# Patient Record
Sex: FEMALE | Race: WHITE | Employment: UNEMPLOYED | URBAN - METROPOLITAN AREA
[De-identification: names, ages, dates, MRNs, and addresses within clinical notes are randomized per-mention and may not be internally consistent; named-entity substitution may affect disease eponyms.]

---
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
## 2025-01-18 LAB
ESTIMATED AVERAGE GLUCOSE: NORMAL
HBA1C MFR BLD: 5.8 %

## 2025-05-01 ENCOUNTER — OFFICE VISIT (OUTPATIENT)
Age: 20
End: 2025-05-01

## 2025-05-01 VITALS
HEART RATE: 100 BPM | HEIGHT: 65 IN | TEMPERATURE: 98.9 F | WEIGHT: 204.7 LBS | SYSTOLIC BLOOD PRESSURE: 100 MMHG | BODY MASS INDEX: 34.1 KG/M2 | DIASTOLIC BLOOD PRESSURE: 67 MMHG | RESPIRATION RATE: 16 BRPM

## 2025-05-01 DIAGNOSIS — F41.9 ANXIETY: Primary | ICD-10-CM

## 2025-05-01 DIAGNOSIS — R07.89 ATYPICAL CHEST PAIN: ICD-10-CM

## 2025-05-01 RX ORDER — BUPROPION HYDROCHLORIDE 300 MG/1
300 TABLET ORAL DAILY
COMMUNITY
Start: 2025-04-16

## 2025-05-01 RX ORDER — BUSPIRONE HYDROCHLORIDE 5 MG/1
5 TABLET ORAL 2 TIMES DAILY
COMMUNITY
Start: 2025-05-01

## 2025-05-01 RX ORDER — BUPROPION HYDROCHLORIDE 150 MG/1
150 TABLET ORAL DAILY
COMMUNITY
Start: 2025-02-06 | End: 2025-05-01

## 2025-05-01 RX ORDER — HYDROXYZINE HYDROCHLORIDE 25 MG/1
25 TABLET, FILM COATED ORAL EVERY 8 HOURS PRN
Qty: 30 TABLET | Refills: 1 | Status: SHIPPED | OUTPATIENT
Start: 2025-05-01 | End: 2025-05-11

## 2025-05-01 ASSESSMENT — ENCOUNTER SYMPTOMS: CHEST TIGHTNESS: 1

## 2025-05-01 NOTE — PROGRESS NOTES
reviewed.   Constitutional:       Appearance: Normal appearance.   HENT:      Head: Normocephalic and atraumatic.      Mouth/Throat:      Pharynx: No posterior oropharyngeal erythema.   Eyes:      Extraocular Movements: Extraocular movements intact.      Pupils: Pupils are equal, round, and reactive to light.   Cardiovascular:      Rate and Rhythm: Normal rate and regular rhythm.      Pulses: Normal pulses.      Heart sounds: Normal heart sounds. No murmur heard.  Pulmonary:      Effort: Pulmonary effort is normal.      Breath sounds: Normal breath sounds.   Musculoskeletal:         General: No swelling or tenderness. Normal range of motion.      Cervical back: Normal range of motion and neck supple.   Skin:     General: Skin is warm and dry.   Neurological:      General: No focal deficit present.      Mental Status: She is alert and oriented to person, place, and time.      Cranial Nerves: No cranial nerve deficit.      Sensory: No sensory deficit.      Motor: No weakness.      Coordination: Coordination normal.   Psychiatric:         Mood and Affect: Mood is anxious. Affect is blunt and tearful.         Behavior: Behavior is agitated.           An electronic signature was used to authenticate this note.    --Kymberly Monique PA-C

## (undated) DEVICE — CATHETER CHOLANGIOGRAM 4.5 FRX3 IN W/ 20018M55 TAUT INTRO

## (undated) DEVICE — FIXED STAPLE DRILL GUIDE HEAD  - SIZE 10: Brand: EASYCLIP

## (undated) DEVICE — DRAPE,LAP,CHOLE,W/TROUGHS,STERILE: Brand: MEDLINE

## (undated) DEVICE — APPLIER CLP M/L SHFT DIA5MM 15 LIG LIGAMAX 5

## (undated) DEVICE — SYRINGE, LUER LOCK, 10ML: Brand: MEDLINE

## (undated) DEVICE — SYRINGE MED 30ML STD CLR PLAS LUERLOCK TIP N CTRL DISP

## (undated) DEVICE — LAPAROSCOPY PACK: Brand: MEDLINE INDUSTRIES, INC.

## (undated) DEVICE — TISSUE RETRIEVAL SYSTEM: Brand: INZII RETRIEVAL SYSTEM

## (undated) DEVICE — SET EXTN PRIMING 4.9ML L30IN INCL SLDE CLMP SPIN M LUERLOCK

## (undated) DEVICE — SUTURE VCRL + SZ 4-0 L18IN ABSRB UD L19MM PS-2 3/8 CIR PRIM VCP496H

## (undated) DEVICE — SUTURE VCRL + SZ 0 L27IN CT 3 ABSRB VCP329H

## (undated) DEVICE — NEEDLE,22GX1.5",REG,BEVEL: Brand: MEDLINE

## (undated) DEVICE — STERILE POLYISOPRENE POWDER-FREE SURGICAL GLOVES: Brand: PROTEXIS

## (undated) DEVICE — CORD ES L10FT MPLR LAP

## (undated) DEVICE — Device

## (undated) DEVICE — C-ARM: Brand: UNBRANDED

## (undated) DEVICE — INSUFFLATION NEEDLE TO ESTABLISH PNEUMOPERITONEUM.: Brand: INSUFFLATION NEEDLE

## (undated) DEVICE — SPONGE,LAP,4"X18",XR,ST,5/PK,40PK/CS: Brand: MEDLINE INDUSTRIES, INC.

## (undated) DEVICE — ELECTRODE PT RET AD L9FT HI MOIST COND ADH HYDRGEL CORDED

## (undated) DEVICE — MERCY FAIRFIELD TURNOVER KIT: Brand: MEDLINE INDUSTRIES, INC.

## (undated) DEVICE — APPLICATOR MEDICATED 26 CC SOLUTION HI LT ORNG CHLORAPREP

## (undated) DEVICE — PLUMEPORT ACTIV LAPAROSCOPIC SMOKE FILTRATION DEVICE: Brand: PLUMEPORT ACTIVE

## (undated) DEVICE — SOLUTION IRRIG 1000ML 0.9% SOD CHL USP POUR PLAS BTL

## (undated) DEVICE — TROCAR: Brand: KII® SLEEVE

## (undated) DEVICE — TROCAR: Brand: KII FIOS FIRST ENTRY

## (undated) DEVICE — LAPAROSCOPIC SCISSORS: Brand: EPIX LAPAROSCOPIC SCISSORS

## (undated) DEVICE — CATHETER CHOLGM 4.5FR L18IN W/ MTL SUPP TB

## (undated) DEVICE — LIQUIBAND RAPID ADHESIVE 36/CS 0.8ML: Brand: MEDLINE